# Patient Record
Sex: FEMALE | Race: WHITE
[De-identification: names, ages, dates, MRNs, and addresses within clinical notes are randomized per-mention and may not be internally consistent; named-entity substitution may affect disease eponyms.]

---

## 2017-12-13 ENCOUNTER — HOSPITAL ENCOUNTER (OUTPATIENT)
Dept: HOSPITAL 84 - D.MAMMO | Age: 67
Discharge: HOME | End: 2017-12-13
Attending: NURSE PRACTITIONER
Payer: MEDICARE

## 2017-12-13 DIAGNOSIS — N60.01: Primary | ICD-10-CM

## 2018-05-30 ENCOUNTER — HOSPITAL ENCOUNTER (OUTPATIENT)
Dept: HOSPITAL 84 - D.ECHO | Age: 68
Discharge: HOME | End: 2018-05-30
Attending: INTERNAL MEDICINE
Payer: MEDICARE

## 2018-05-30 VITALS — BODY MASS INDEX: 31.3 KG/M2

## 2018-05-30 DIAGNOSIS — E78.5: ICD-10-CM

## 2018-05-30 DIAGNOSIS — R00.2: ICD-10-CM

## 2018-05-30 DIAGNOSIS — R07.9: Primary | ICD-10-CM

## 2018-06-21 ENCOUNTER — HOSPITAL ENCOUNTER (OUTPATIENT)
Dept: HOSPITAL 84 - D.CATH | Age: 68
Discharge: HOME | End: 2018-06-21
Attending: INTERNAL MEDICINE
Payer: MEDICARE

## 2018-06-21 VITALS — DIASTOLIC BLOOD PRESSURE: 58 MMHG | SYSTOLIC BLOOD PRESSURE: 157 MMHG

## 2018-06-21 VITALS
WEIGHT: 194.41 LBS | HEIGHT: 66 IN | BODY MASS INDEX: 31.24 KG/M2 | BODY MASS INDEX: 31.24 KG/M2 | WEIGHT: 194.41 LBS | HEIGHT: 66 IN

## 2018-06-21 DIAGNOSIS — I25.10: Primary | ICD-10-CM

## 2018-06-21 DIAGNOSIS — Z01.812: ICD-10-CM

## 2018-06-21 LAB
ANION GAP SERPL CALC-SCNC: 11.4 MMOL/L (ref 8–16)
BASOPHILS NFR BLD AUTO: 0.8 % (ref 0–2)
BUN SERPL-MCNC: 14 MG/DL (ref 7–18)
CALCIUM SERPL-MCNC: 9.4 MG/DL (ref 8.5–10.1)
CHLORIDE SERPL-SCNC: 106 MMOL/L (ref 98–107)
CO2 SERPL-SCNC: 27.8 MMOL/L (ref 21–32)
CREAT SERPL-MCNC: 0.8 MG/DL (ref 0.6–1.3)
EOSINOPHIL NFR BLD: 2.3 % (ref 0–7)
ERYTHROCYTE [DISTWIDTH] IN BLOOD BY AUTOMATED COUNT: 12.8 % (ref 11.5–14.5)
GLUCOSE SERPL-MCNC: 105 MG/DL (ref 74–106)
HCT VFR BLD CALC: 40.1 % (ref 36–48)
HGB BLD-MCNC: 13.5 G/DL (ref 12–16)
IMM GRANULOCYTES NFR BLD: 0 % (ref 0–5)
LYMPHOCYTES NFR BLD AUTO: 43.6 % (ref 15–50)
MCH RBC QN AUTO: 28.7 PG (ref 26–34)
MCHC RBC AUTO-ENTMCNC: 33.7 G/DL (ref 31–37)
MCV RBC: 85.3 FL (ref 80–100)
MONOCYTES NFR BLD: 9.5 % (ref 2–11)
NEUTROPHILS NFR BLD AUTO: 43.8 % (ref 40–80)
OSMOLALITY SERPL CALC.SUM OF ELEC: 281 MOSM/KG (ref 275–300)
PLATELET # BLD: 226 10X3/UL (ref 130–400)
PMV BLD AUTO: 9.9 FL (ref 7.4–10.4)
POTASSIUM SERPL-SCNC: 4.2 MMOL/L (ref 3.5–5.1)
RBC # BLD AUTO: 4.7 10X6/UL (ref 4–5.4)
SODIUM SERPL-SCNC: 141 MMOL/L (ref 136–145)
WBC # BLD AUTO: 5.3 10X3/UL (ref 4.8–10.8)

## 2019-07-09 ENCOUNTER — HOSPITAL ENCOUNTER (OUTPATIENT)
Dept: HOSPITAL 84 - D.MAMMO | Age: 69
Discharge: HOME | End: 2019-07-09
Attending: FAMILY MEDICINE
Payer: MEDICARE

## 2019-07-09 VITALS — BODY MASS INDEX: 31.3 KG/M2

## 2019-07-09 DIAGNOSIS — Z12.31: Primary | ICD-10-CM

## 2020-01-17 ENCOUNTER — HOSPITAL ENCOUNTER (OUTPATIENT)
Dept: HOSPITAL 84 - D.HCCECHO | Age: 70
Discharge: HOME | End: 2020-01-17
Attending: INTERNAL MEDICINE
Payer: MEDICARE

## 2020-01-17 VITALS — BODY MASS INDEX: 31.3 KG/M2

## 2020-01-17 DIAGNOSIS — I25.10: Primary | ICD-10-CM

## 2020-01-20 NOTE — EC
PATIENT:LUZMA ORELLANA               DATE OF SERVICE: 01/17/20
SEX: F                                  MEDICAL RECORD: [id number]
DATE OF BIRTH: [birth year]                        LOCATION:D.[facility]          
AGE OF PATIENT: 70                             ADMISSION DATE: 01/17/20
 
REFERRING PHYSICIAN:                               
 
INTERPRETING PHYSICIAN: ALISIA SINGH MD          
 
 
 
                             ECHOCARDIOGRAM REPORT
  ECHO CHARGES 4               ECHO COMPLETE                 Date: 01/17/20
 
 
 
CLINICAL DIAGNOSIS: CP/MURMUR                     
                    H/O HTN                       
                         ECHOCARDIOGRAPHIC MEASUREMENTS
      (adult normal given)
   AC root (d.<3.7cm) 3.2  cm   LV Septum d (<1.2 cm> 1.5  cm
      Valve Excursion 2.0  cm     LV Septum (systole) 2.1  cm
Left Atria (s.<4.0cm> 4.0  cm          LVPW d(<1.2cm) 1.4  cm
        RV (d.<2.3cm) 2.7  cm           LVPW (sytole) 2.1  cm
  LV diastole(<5.6CM) 5.7  cm       MV E-F(>70mm/sec)      cm
           LV systole 3.7  cm           LVOT Diameter 2.1  cm
       MV exc.(>10mm)      cm
Est.ejection fraction (50-75%)     %
 
   DOPPLER:
     LVIT      cm/sec A 104  cm/sec E 73.0  cm/sec
       LA      cm/sec      RVSP 32.2 mmHg
     LVOT 105  cm/sec   AOP1/2T      m/s
  Asc. Ao 122  cm/sec
     RVOT 66.0 cm/sec
       RA      cm/sec
         cm/sec
 AV Gradient Peak 6.0  mmHg  AV Mean 3.5  mmHg  AV Area 2.4  cm
 MV Gradient Peak 4.6  mmHg  MV Mean 1.6  mmHg  MV Area      cm
   COMMENTS: OP - HC                                      
 
 
 Cardiac Sonographer: Zaida RODRIGUEZOE            
      Cardiologist: 3          Dr. Lopez             
             TAPE# PACS           
                                       Pericardial Effusion N                        
 
 
DATE OF SERVICE:  01/17/2020
 
Adequate 2D echo, color flow, spectral Doppler, and M-mode.
 
LVH is present.  LV internal dimension is normal.  Wall motion is normal.  EF is
greater than or equal to 55%.  Aortic valve is sclerotic.  No evidence of
stenosis by Doppler interrogation.  The left atrium is normal at 4.0 cm.  Mitral
valve shows no prolapse.  Trace MR.  Right-sided chambers grossly normal.  
Trace TR.
 
 
 
 
ECHOCARDIOGRAM REPORT                          [id number]    ORELLANA,LUZMA ABIODUN       
 
 
TRANSINT:[id number] Voice Confirmation ID: [id number] DOCUMENT ID: [id number]
                                           
                                           ALISIA SINGH MD          
 
 
 
Electronically Signed by ALISIA SINGH on 01/20/20 at 0828
 
 
 
 
 
 
 
 
 
 
 
 
 
 
 
 
 
 
 
 
 
 
 
 
 
 
 
 
 
 
 
 
 
 
 
 
 
 
 
CC:                                                             [id number]
DICTATION DATE: 01/17/20 1321     :     01/17/20 2309      DEP CLI 
                                                                      01/17/20
[place] [id number] [place] [id number]

## 2020-08-06 ENCOUNTER — HOSPITAL ENCOUNTER (OUTPATIENT)
Dept: HOSPITAL 84 - D.MAMMO | Age: 70
Discharge: HOME | End: 2020-08-06
Attending: FAMILY MEDICINE
Payer: MEDICARE

## 2020-08-06 VITALS — BODY MASS INDEX: 31.3 KG/M2

## 2020-08-06 DIAGNOSIS — Z12.31: Primary | ICD-10-CM

## 2021-02-25 ENCOUNTER — HOSPITAL ENCOUNTER (OUTPATIENT)
Dept: HOSPITAL 84 - D.HCCECHO | Age: 71
Discharge: HOME | End: 2021-02-25
Attending: INTERNAL MEDICINE
Payer: MEDICARE

## 2021-02-25 VITALS — BODY MASS INDEX: 31.3 KG/M2

## 2021-02-25 DIAGNOSIS — I10: Primary | ICD-10-CM

## 2021-03-01 NOTE — EC
PATIENT:LUZMA ORELLANA               DATE OF SERVICE: 02/25/21
SEX: F                                  MEDICAL RECORD: [id number]
DATE OF BIRTH: [birth year]                        LOCATION:D.[facility]          
AGE OF PATIENT: 71                             ADMISSION DATE: 02/25/21
 
REFERRING PHYSICIAN:                               
 
INTERPRETING PHYSICIAN: ALISIA SINGH MD          
 
 
 
                             ECHOCARDIOGRAM REPORT
  ECHO CHARGES 4               ECHO COMPLETE                 Date: 02/25/21
 
 
 
CLINICAL DIAGNOSIS: HX OF CAD/HTN                 
                    ASSESS EF AND VALVES          
                         ECHOCARDIOGRAPHIC MEASUREMENTS
      (adult normal given)
   AC root (d.<3.7cm) 3.0  cm   LV Septum d (<1.2 cm> 1.5  cm
      Valve Excursion 1.5  cm     LV Septum (systole) 1.8  cm
Left Atria (s.<4.0cm> 3.6  cm          LVPW d(<1.2cm) 1.3  cm
        RV (d.<2.3cm) 3.4  cm           LVPW (sytole) 1.8  cm
  LV diastole(<5.6CM) 5.1  cm       MV E-F(>70mm/sec)      cm
           LV systole 3.1  cm           LVOT Diameter 2.0  cm
       MV exc.(>10mm) 1.5  cm
Est.ejection fraction (50-75%)     %
 
   DOPPLER:
     LVIT      cm/sec A 81.0 cm/sec E 74.0  cm/sec
       LA      cm/sec      RVSP 31   mmHg
     LVOT 101  cm/sec   AOP1/2T      m/s
  Asc. Ao 133  cm/sec
     RVOT 92   cm/sec
       RA      cm/sec
         cm/sec
 AV Gradient Peak 7.12 mmHg  AV Mean 3.17 mmHg  AV Area 2.2  cm
 MV Gradient Peak 3.46 mmHg  MV Mean 1.43 mmHg  MV Area      cm
   COMMENTS:                                              
 
 
 Cardiac Sonographer: 2               EDER MOTA              
      Cardiologist: 3          Dr. Lopez             
             TAPE# PACS           
                                       Pericardial Effusion N                        
 
 
DATE OF SERVICE:  
 
Adequate 2D, color flow imaging, spectral Doppler, and M-Mode.
 
FINDINGS:  LVH is present.  LV internal dimension is normal.  Wall motion is
normal.  EF is greater than or equal to 55%.  Aortic valve is tricuspid.  No
evidence of stenosis by Doppler interrogation.  Left atrium is normal at 3.6 cm.
 Mitral valve shows no prolapse.  Trace MR.  Right side is grossly normal. 
Trace TR.
 
 
 
 
ECHOCARDIOGRAM REPORT                          [id number]    LUZMA ORELLANA       
 
 
TRANSINT:[id number] Voice Confirmation ID: [id number] DOCUMENT ID: [id number]
                                           
                                           ALISIA SINGH MD          
 
 
 
Electronically Signed by ALISIA SINGH on 03/01/21 at 0850
 
 
 
 
 
 
 
 
 
 
 
 
 
 
 
 
 
 
 
 
 
 
 
 
 
 
 
 
 
 
 
 
 
 
 
 
 
 
 
CC:                                                             [id number]
DICTATION DATE: 02/25/21 1536     :     02/25/21 1748      DEP CLI 
                                                                      02/25/21
[place] [id number] [place] [phone]